# Patient Record
Sex: MALE | Race: WHITE | NOT HISPANIC OR LATINO | URBAN - METROPOLITAN AREA
[De-identification: names, ages, dates, MRNs, and addresses within clinical notes are randomized per-mention and may not be internally consistent; named-entity substitution may affect disease eponyms.]

---

## 2024-03-23 ENCOUNTER — EMERGENCY (EMERGENCY)
Facility: HOSPITAL | Age: 24
LOS: 1 days | Discharge: HOME | End: 2024-03-23
Admitting: EMERGENCY MEDICINE
Payer: SELF-PAY

## 2024-03-23 VITALS
HEART RATE: 82 BPM | TEMPERATURE: 98 F | SYSTOLIC BLOOD PRESSURE: 139 MMHG | OXYGEN SATURATION: 97 % | RESPIRATION RATE: 16 BRPM | DIASTOLIC BLOOD PRESSURE: 77 MMHG

## 2024-03-23 PROCEDURE — 99283 EMERGENCY DEPT VISIT LOW MDM: CPT

## 2024-03-23 RX ADMIN — Medication 1 TABLET(S): at 20:14

## 2024-03-23 NOTE — ED PROVIDER NOTE - CLINICAL SUMMARY MEDICAL DECISION MAKING FREE TEXT BOX
24 y/o male with no significant PMHx presents to the ER c/o redness and swelling below left eye x 2 days.  Pt states he had a pimple there that he tried to pop and then he developed swelling and redness.  Pt is well appearing, NAD, exam consistent with preseptal cellulitis.  Will give a course of Augmentin, advise warm compresses, follow up PMD.

## 2024-03-23 NOTE — ED ADULT TRIAGE NOTE - CHIEF COMPLAINT QUOTE
c/o atraumatic left periorbital irritation/swelling x 2 days. Denies any fever/chills. Denies any Phx.

## 2024-03-23 NOTE — ED ADULT NURSE NOTE - OBJECTIVE STATEMENT
Pt arrives to the ED complaining of atraumatic left eye swelling for 2 days. No medical hx. Pt denies shortness of breath, chest pain, headaches, dizziness, numbness and tingling, n/v, fever and chills. Airway is patent, respirations are even and unlabored. Pt medicated per MAR. Plan of care ongoing, safety maintained.

## 2024-03-23 NOTE — ED PROVIDER NOTE - PATIENT PORTAL LINK FT
You can access the FollowMyHealth Patient Portal offered by [facility] by registering at the following website: [email or web address]. By joining [facility]’s FollowMyHealth portal, you will also be able to view your health information using other applications (apps) compatible with our system.

## 2024-03-23 NOTE — ED PROVIDER NOTE - OBJECTIVE STATEMENT
24 y/o male with no significant PMHx presents to the ER c/o redness and swelling below left eye x 2 days.  Pt states he had a pimple there that he tried to pop and then he developed swelling and redness.  Pt denies fevers, chills, eye pain, discharge, trauma.  Pt does not wear glasses or contacts.   H&P obtained via  [id number].

## 2024-03-23 NOTE — ED PROVIDER NOTE - PHYSICAL EXAMINATION
PERRL, EMOI without pain, no eye erythema.  Small area of redness and slight swelling noted below left eye, no fluctuance, no discharge.

## 2024-03-23 NOTE — ED PROVIDER NOTE - NSFOLLOWUPINSTRUCTIONS_ED_ALL_ED_FT
Follow up with your Primary Medical Doctor in 1-2 days.  Follow up with Opthalmology in 1-2 days call to schedule an appointment [phone].  Warm compresses to affected area.  Take Augmentin one tablet orally 2 x a day x 7 days.  Return to the ER for any persistent/worsening or new symptoms fevers, chills, increased redness, swelling, eye pain, or any concerning symptoms.